# Patient Record
Sex: MALE | Race: WHITE | NOT HISPANIC OR LATINO | URBAN - METROPOLITAN AREA
[De-identification: names, ages, dates, MRNs, and addresses within clinical notes are randomized per-mention and may not be internally consistent; named-entity substitution may affect disease eponyms.]

---

## 2023-02-25 ENCOUNTER — HOSPITAL ENCOUNTER (EMERGENCY)
Facility: OTHER | Age: 58
Discharge: HOME | End: 2023-02-25
Attending: EMERGENCY MEDICINE
Payer: COMMERCIAL

## 2023-02-25 VITALS
OXYGEN SATURATION: 96 % | SYSTOLIC BLOOD PRESSURE: 119 MMHG | WEIGHT: 165 LBS | DIASTOLIC BLOOD PRESSURE: 63 MMHG | BODY MASS INDEX: 22.35 KG/M2 | HEIGHT: 72 IN | TEMPERATURE: 98 F | RESPIRATION RATE: 16 BRPM | HEART RATE: 67 BPM

## 2023-02-25 DIAGNOSIS — M54.42 ACUTE LEFT-SIDED LOW BACK PAIN WITH LEFT-SIDED SCIATICA: Primary | ICD-10-CM

## 2023-02-25 PROCEDURE — 63600175 PHARM REV CODE 636 W HCPCS: Performed by: EMERGENCY MEDICINE

## 2023-02-25 PROCEDURE — 25000003 PHARM REV CODE 250: Performed by: NURSE PRACTITIONER

## 2023-02-25 PROCEDURE — 99284 EMERGENCY DEPT VISIT MOD MDM: CPT

## 2023-02-25 PROCEDURE — 96372 THER/PROPH/DIAG INJ SC/IM: CPT | Performed by: EMERGENCY MEDICINE

## 2023-02-25 RX ORDER — DIAZEPAM 5 MG/1
5 TABLET ORAL EVERY 6 HOURS PRN
Qty: 15 TABLET | Refills: 0 | Status: SHIPPED | OUTPATIENT
Start: 2023-02-25 | End: 2023-03-27

## 2023-02-25 RX ORDER — DIAZEPAM 10 MG/2ML
5 INJECTION INTRAMUSCULAR
Status: COMPLETED | OUTPATIENT
Start: 2023-02-25 | End: 2023-02-25

## 2023-02-25 RX ORDER — IBUPROFEN 800 MG/1
800 TABLET ORAL EVERY 6 HOURS PRN
Qty: 30 TABLET | Refills: 0 | Status: SHIPPED | OUTPATIENT
Start: 2023-02-25

## 2023-02-25 RX ORDER — LIDOCAINE 50 MG/G
1 PATCH TOPICAL
Status: DISCONTINUED | OUTPATIENT
Start: 2023-02-25 | End: 2023-02-25 | Stop reason: HOSPADM

## 2023-02-25 RX ORDER — HYDROCODONE BITARTRATE AND ACETAMINOPHEN 5; 325 MG/1; MG/1
1 TABLET ORAL
Status: COMPLETED | OUTPATIENT
Start: 2023-02-25 | End: 2023-02-25

## 2023-02-25 RX ORDER — KETOROLAC TROMETHAMINE 30 MG/ML
30 INJECTION, SOLUTION INTRAMUSCULAR; INTRAVENOUS
Status: COMPLETED | OUTPATIENT
Start: 2023-02-25 | End: 2023-02-25

## 2023-02-25 RX ORDER — HYDROCODONE BITARTRATE AND ACETAMINOPHEN 5; 325 MG/1; MG/1
1-2 TABLET ORAL EVERY 6 HOURS PRN
Qty: 12 TABLET | Refills: 0 | Status: SHIPPED | OUTPATIENT
Start: 2023-02-25 | End: 2023-03-07

## 2023-02-25 RX ADMIN — KETOROLAC TROMETHAMINE 30 MG: 30 INJECTION, SOLUTION INTRAMUSCULAR; INTRAVENOUS at 05:02

## 2023-02-25 RX ADMIN — LIDOCAINE 1 PATCH: 50 PATCH CUTANEOUS at 02:02

## 2023-02-25 RX ADMIN — HYDROCODONE BITARTRATE AND ACETAMINOPHEN 1 TABLET: 5; 325 TABLET ORAL at 02:02

## 2023-02-25 RX ADMIN — DIAZEPAM 5 MG: 10 INJECTION, SOLUTION INTRAMUSCULAR; INTRAVENOUS at 05:02

## 2023-02-25 NOTE — ED PROVIDER NOTES
"Encounter Date: 2/25/2023    SCRIBE #1 NOTE: I, Kyra Dennison, am scribing for, and in the presence of,  Cem Stone II, MD. I have scribed the following portions of the note - Other sections scribed: HPI, ROS, PE, XR.     History     Chief Complaint   Patient presents with    Back Pain     C/o lower back pain 10/10 s/t playing tennis today. PMH herniated disc 20 yrs ago. VSS     Chandra Wan is a 57 y.o. male who presents to the ED for evaluation of 10/10 intensity "excruciating" lower left back pain. Onset is earlier today after playing tennis, stating he felt a "twinge... I think I herniated a disc. I've done it before 20 years ago." States his pain is shooting down his left leg to his left knee. He has taken 3 Ibuprofen at home PTA. He endorses tingling through his bilateral hands but otherwise denies extremity numbness. He has NKDA. Denies abdominal pain, urinary or bowel incontinence, other injuries, pain elsewhere, and other somatic complaints. This is the extent of the patient's complaints at this time.    The history is provided by the patient.   Review of patient's allergies indicates:  No Known Allergies  No past medical history on file.  No past surgical history on file.  No family history on file.     Review of Systems   Constitutional:  Negative for appetite change, chills, diaphoresis, fatigue and fever.   HENT:  Negative for ear discharge, facial swelling, hearing loss, nosebleeds, tinnitus, trouble swallowing and voice change.    Eyes:  Negative for photophobia, pain, discharge, redness and visual disturbance.   Respiratory:  Negative for cough, choking, chest tightness, shortness of breath and wheezing.    Cardiovascular:  Negative for chest pain, palpitations and leg swelling.   Gastrointestinal:  Negative for abdominal distention, abdominal pain, blood in stool, constipation, diarrhea, nausea and vomiting.   Genitourinary:  Negative for difficulty urinating, dysuria, hematuria, penile " "discharge, scrotal swelling, testicular pain and urgency.   Musculoskeletal:  Positive for back pain. Negative for arthralgias, gait problem, joint swelling, myalgias and neck pain.   Skin:  Negative for color change, pallor and rash.   Neurological:  Negative for dizziness, seizures, syncope, facial asymmetry, speech difficulty, weakness, numbness and headaches.        +Bilateral hand "tingling"   Hematological:  Negative for adenopathy. Does not bruise/bleed easily.   Psychiatric/Behavioral:  Negative for confusion, hallucinations, self-injury and suicidal ideas.      Physical Exam     Initial Vitals   BP Pulse Resp Temp SpO2   02/25/23 1429 02/25/23 1429 02/25/23 1429 02/25/23 1806 02/25/23 1429   (!) 142/73 65 20 98.2 °F (36.8 °C) 100 %      MAP       --                Physical Exam    Nursing note and vitals reviewed.  Constitutional: He appears well-developed and well-nourished. He is not diaphoretic. He appears distressed.   Uncomfortable appearing. Mild distress due to pain.   HENT:   Head: Normocephalic and atraumatic.   Eyes: Conjunctivae and EOM are normal. Pupils are equal, round, and reactive to light.   Neck: Neck supple.   Normal range of motion.  Cardiovascular:            Pulses:       Dorsalis pedis pulses are 2+ on the right side and 2+ on the left side.   Abdominal: Abdomen is soft. There is no abdominal tenderness. There is no rebound and no guarding.   Musculoskeletal:         General: No edema. Normal range of motion.      Cervical back: Normal range of motion and neck supple.      Comments: No midline lumbar tenderness. Pain to left lateral lower lumbar region. No distal edema.     Neurological: He is alert and oriented to person, place, and time. He has normal strength.   5/5 strength throughout lower extremities. Intact distal sensation.   Skin: Skin is warm and dry.   Psychiatric: He has a normal mood and affect. His behavior is normal. Judgment and thought content normal.       ED Course "   Procedures  Labs Reviewed - No data to display       Imaging Results               X-Ray Lumbar Spine Ap And Lateral (Final result)  Result time 02/25/23 16:14:07      Final result by Ashlie Smith MD (02/25/23 16:14:07)                   Impression:      Lumbar levoscoliosis and degenerative changes of the spine as described above.    This report was flagged in Epic as abnormal.      Electronically signed by: Ashlie Smith MD  Date:    02/25/2023  Time:    16:14               Narrative:    EXAMINATION:  XR LUMBAR SPINE AP AND LATERAL    CLINICAL HISTORY:  low back pain;    TECHNIQUE:  AP, lateral and spot images were performed of the lumbar spine.    COMPARISON:  None    FINDINGS:  Hip replacement is identified on the inferolateral image.  There is a lumbar levoscoliosis.  2.1 cm calcific density to the right of L4 on the frontal image may be related to intestinal contents are could be external to the patient; this is not identified on the lateral images.  Lumbar vertebral body heights are satisfactorily maintained.  There is multilevel disc space narrowing, moderate to severe at L2-3 and L3-4 and mild at the remaining lumbar levels.  There is vacuum disc phenomenon at L2-3.  There are mild degenerative changes of the facet joints.  There is a moderate amount of stool in the colon..                                    X-Rays:   Independently Interpreted Readings:   Other Readings:  L Spine: Multilevel degenerative changes. No fracture.  Medications   HYDROcodone-acetaminophen 5-325 mg per tablet 1 tablet (1 tablet Oral Given 2/25/23 1442)   ketorolac injection 30 mg (30 mg Intramuscular Given 2/25/23 1713)   diazePAM injection 5 mg (5 mg Intramuscular Given 2/25/23 1715)     Medical Decision Making:   History:   Old Medical Records: I decided to obtain old medical records.  Independently Interpreted Test(s):   I have ordered and independently interpreted X-rays - see prior notes.  Clinical Tests:    Radiological Study: Ordered and Reviewed   Patient presents with sudden onset of back pain, after playing tennis.  No falls or trauma.  Similar symptoms several years ago, concerned he has a bulging disc again.  He does have radiation down the left leg, but no change in bowel or bladder, no numbness or weakness by complaint or on exam.  Signs symptoms do not suggest cauda equina or neurovascular compromise.  X-ray ordered by triage, shows degenerative change but no acute fracture.  After anti-inflammatory, muscle relaxant patient is feeling better.  He is able to move easier.  He feels comfortable with discharge at this time.  Will give prescriptions for similar medications for the next few days.  He does have good follow-up with Orthopedics, including back specialist.  Understands return precautions.    Scribe Attestation:   Scribe #1: I performed the above scribed service and the documentation accurately describes the services I performed. I attest to the accuracy of the note.    I, [facility], personally performed the services described in this documentation. All medical record entries made by the scribe were at my direction and in my presence. I have reviewed the chart and agree that the record reflects my personal performance and is accurate and complete.                   Clinical Impression:   Final diagnoses:  [M54.42] Acute left-sided low back pain with left-sided sciatica (Primary)        ED Disposition Condition    Discharge Stable          ED Prescriptions       Medication Sig Dispense Start Date End Date Auth. Provider    ibuprofen (ADVIL,MOTRIN) 800 MG tablet Take 1 tablet (800 mg total) by mouth every 6 (six) hours as needed for Pain. 30 tablet 2/25/2023 -- Cem Stone II, MD    diazePAM (VALIUM) 5 MG tablet Take 1 tablet (5 mg total) by mouth every 6 (six) hours as needed (muscle spasm). 15 tablet 2/25/2023 3/27/2023 Cem Stone II, MD    HYDROcodone-acetaminophen (NORCO) 5-325 mg per tablet  Take 1-2 tablets by mouth every 6 (six) hours as needed for Pain. 12 tablet 2/25/2023 3/7/2023 Cem Stone II, MD          Follow-up Information       Follow up With Specialties Details Why Contact Info    Your Orthopedist  In 5 days               Cem Stone II, MD  02/26/23 [id number]

## 2023-02-25 NOTE — FIRST PROVIDER EVALUATION
Emergency Department TeleTriage Encounter Note      CHIEF COMPLAINT    Chief Complaint   Patient presents with    Back Pain     C/o lower back pain 10/10 s/t playing tennis today. PMH herniated disc 20 yrs ago. VSS       VITAL SIGNS   Initial Vitals [02/25/23 1429]   BP Pulse Resp Temp SpO2   (!) 142/73 65 20 -- 100 %      MAP       --            ALLERGIES    Review of patient's allergies indicates:  No Known Allergies    PROVIDER TRIAGE NOTE  This is a teletriage evaluation of a 57 y.o. male presenting to the ED complaining of low back pain starting while playing tennis today. Reports that he is unable to sit or stand due to pain.  Tingling RLE. Denies loss of control of bowel or bladder. Pmhx of herniated disc.    Pt is laying on the floor due to pain.  Will start with xray and PO norco.     Initial orders will be placed and care will be transferred to an alternate provider when patient is roomed for a full evaluation. Any additional orders and the final disposition will be determined by that provider.         ORDERS  Labs Reviewed - No data to display    ED Orders (720h ago, onward)      Start Ordered     Status Ordering Provider    02/25/23 1445 02/25/23 1437  LIDOcaine 5 % patch 1 patch  Every 24 hours (non-standard times)         Ordered MARYELLEN MALONE N.    02/25/23 1445 02/25/23 1437  HYDROcodone-acetaminophen 5-325 mg per tablet 1 tablet  ED 1 Time         Ordered MARYELLEN MALONE N.    02/25/23 1437 02/25/23 1436  X-Ray Lumbar Spine Ap And Lateral  1 time imaging         Ordered MARYELLEN MALONE              Virtual Visit Note: The provider triage portion of this emergency department evaluation and documentation was performed via [employer] App, a HIPAA-compliant telemedicine application, in concert with a tele-presenter in the room. A face to face patient evaluation with one of my colleagues will occur once the patient is placed in an emergency department room.      DISCLAIMER: This  note was prepared with [employer] voice recognition transcription software. Garbled syntax, mangled pronouns, and other bizarre constructions may be attributed to that software system.